# Patient Record
(demographics unavailable — no encounter records)

---

## 2024-11-08 NOTE — PHYSICAL EXAM
Kendra will call patient on 10/23 for scheduling   [de-identified] : TTP to the thoracic paraspinal.  pain with flexion and extension in thoracic spine.

## 2024-11-08 NOTE — DATA REVIEWED
[FreeTextEntry1] : 11/10/2022 MRI of the lumbar spine report available for review today from [facility]. There is noted multilevel disc bulges with central stenosis, minimal anterolisthesis at L5-S1.   MRI and x-ray of the lumbar spine from [facility] 06/28/24. He is found to have mild lumbar spondylosis. There is an isthmic spondylolisthesis at L5/S1 with mild instability.

## 2024-11-08 NOTE — DISCUSSION/SUMMARY
[de-identified] : A discussion regarding available pain management treatment options occurred with the patient. These included interventional, rehabilitative, pharmacological, and alternative modalities. We will proceed with the following:   Interventional treatment options: deferred Rehabilitative options: - Physical therapy of the thoracic spine2-3 times a week for 4-8 weeks stressing a home exercise program of walking, shoulder griddle strengthening, swimming, elliptical , recumbent bike, Biju chi and Yoga. Use things that heat like hot shower or icy heat before rehab, exercising and at the beginning of the day, and ice (ice in a bag never directly on the skin) after activity and at the end of the day. -Continue with chiropractic therapy.  Medication based treatment options: -Renewed meloxicam 15mg daily for 4 weeks. Discussed risks and benefits. Avoid taking for any side effects. -Patient is aware to take for 2 weeks straight than take 1 week off before repeating. -Ordered methocarbamol 750 mg 3 times daily as needed for duration of 4 weeks.   Complementary treatment options: - Patient was advised to stay away from any heavy lifting. If needed, he was advised to squat and not bend forward. - Physician directed activity and lifestyle modifications. - Ice the affected area as needed.  Follow up in 2-3 months for reassessment.  I, Karthikeyan Majano, attest that this documentation has been prepared under the direction and in the presence of Provider Lior Mann, DO The documentation recorded by the scribe, in my presence, accurately reflects the service I personally performed, and the decisions made by me with my edits as appropriate.   Best Regards, Lior Mann D.O.

## 2024-11-08 NOTE — HISTORY OF PRESENT ILLNESS
[FreeTextEntry1] : HISTORY OF PRESENT ILLNESS: Mr. Mariscal is a 34-year-old male complaining of lower/mid back pain. Patient endorses that his low back pain commenced approximately 3 years ago after the birth of his first child. Constantly lifting, carrying the child exacerbated discomfort. Patient describes the pain as moderate to severe. He describes the pain as stabbing. During the last month the pain has been nearly constant with symptoms worsening in no typical pattern. He denies any radiculopathy to the lower extremities. Isolated mid back pain, midline. He does not take any oral analgesics other than Tylenol periodically.  Ambulating steadily without any assistive devices. Denies incontinence of bowel or bladder. NSAIDs and HEP has been tried without significant relief.   PRESENTING TODAY 11-: Last seen on 09/27/2024 and since then he is status post thoracic paraspinal trigger point injections which provided him with 50% sustained relief to date. Patient presents to the office today for a follow up visit with continued low/mid back pain. He has been taking Meloxicam 15 mg daily which provides him with 30-40% improvement in pain and increase in function. He denies any side effects.    MRI showed a disc herniation at T3-4.  He has been attending sessions of chiropractic therapy for the last 6 weeks with moderate relief.  He rates his pain a 4-5/10 on the pain scale. Patient denies any bowel or bladder dysfunction, incontinence, or saddle anesthesia.

## 2025-01-10 NOTE — DATA REVIEWED
[FreeTextEntry1] : 11/10/2022 MRI of the lumbar spine report available for review today from [facility]. There is noted multilevel disc bulges with central stenosis, minimal anterolisthesis at L5-S1.   MRI and x-ray of the lumbar spine from [facility] 06/28/24. He is found to have mild lumbar spondylosis. There is an isthmic spondylolisthesis at L5/S1 with mild instability.

## 2025-01-10 NOTE — HISTORY OF PRESENT ILLNESS
[FreeTextEntry1] : HISTORY OF PRESENT ILLNESS: Mr. Mariscal is a 34-year-old male complaining of lower/mid back pain. Patient endorses that his low back pain commenced approximately 3 years ago after the birth of his first child. Constantly lifting, carrying the child exacerbated discomfort. Patient describes the pain as moderate to severe. He describes the pain as stabbing. During the last month the pain has been nearly constant with symptoms worsening in no typical pattern. He denies any radiculopathy to the lower extremities. Isolated mid back pain, midline. He does not take any oral analgesics other than Tylenol periodically.  Ambulating steadily without any assistive devices. Denies incontinence of bowel or bladder. NSAIDs and HEP has been tried without significant relief.   PRESENTING TODAY 1-: Last seen on 11/08/2024 and since then he is most recently status post thoracic paraspinal trigger point injections which provided him with 50% sustained relief to date. Patient presents to the office today for a follow up visit with continued upper/mid back pain. He has been taking Meloxicam 15 mg daily which provides him with 30-40% improvement in pain and increase in function. He denies any side effects. MRI showed a disc herniation at T3-4. He completed sessions of chiropractic therapy for 6 weeks with moderate relief.  He rates his pain a 4-5/10 on the pain scale. Patient denies any bowel or bladder dysfunction, incontinence, or saddle anesthesia.

## 2025-01-10 NOTE — DISCUSSION/SUMMARY
[de-identified] : A discussion regarding available pain management treatment options occurred with the patient. These included interventional, rehabilitative, pharmacological, and alternative modalities. We will proceed with the following:   Interventional treatment options: deferred  Rehabilitative options: - c/w Physical therapy of the thoracic spine2-3 times a week for 4-8 weeks stressing a home exercise program of walking, shoulder griddle strengthening, swimming, elliptical , recumbent bike, Biju chi and Yoga. Use things that heat like hot shower or icy heat before rehab, exercising and at the beginning of the day, and ice (ice in a bag never directly on the skin) after activity and at the end of the day. -Continue with chiropractic therapy.  Medication based treatment options: - c/w meloxicam 15mg daily for 4 weeks. Discussed risks and benefits. Avoid taking for any side effects. -Patient is aware to take for 2 weeks straight than take 1 week off before repeating. - c/w methocarbamol 750 mg 3 times daily as needed for duration of 4 weeks.   Complementary treatment options: - Patient was advised to stay away from any heavy lifting. If needed, he was advised to squat and not bend forward. - Physician directed activity and lifestyle modifications. - Ice the affected area as needed.  Follow up in 2-3 months for reassessment.  I, Karthikeyan Majano, attest that this documentation has been prepared under the direction and in the presence of Provider Lior Mann, DO The documentation recorded by the scribe, in my presence, accurately reflects the service I personally performed, and the decisions made by me with my edits as appropriate.   Best Regards, Lior Mann D.O.